# Patient Record
Sex: MALE | Race: WHITE | Employment: FULL TIME | URBAN - METROPOLITAN AREA
[De-identification: names, ages, dates, MRNs, and addresses within clinical notes are randomized per-mention and may not be internally consistent; named-entity substitution may affect disease eponyms.]

---

## 2022-11-23 ENCOUNTER — HOSPITAL ENCOUNTER (EMERGENCY)
Facility: HOSPITAL | Age: 27
Discharge: HOME | End: 2022-11-23
Admitting: PHYSICIAN ASSISTANT
Payer: COMMERCIAL

## 2022-11-23 VITALS
HEART RATE: 73 BPM | OXYGEN SATURATION: 98 % | BODY MASS INDEX: 29.62 KG/M2 | TEMPERATURE: 98.4 F | SYSTOLIC BLOOD PRESSURE: 134 MMHG | RESPIRATION RATE: 16 BRPM | WEIGHT: 200 LBS | DIASTOLIC BLOOD PRESSURE: 92 MMHG | HEIGHT: 69 IN

## 2022-11-23 DIAGNOSIS — K12.0 APHTHOUS ULCER: ICD-10-CM

## 2022-11-23 LAB
DEPRECATED S PYO AG THROAT QL EIA: NEGATIVE
FLUAV RNA SPEC QL NAA+PROBE: NEGATIVE
FLUBV RNA RESP QL NAA+PROBE: NEGATIVE
GROUP A STREP BY PCR: NOT DETECTED
RSV RNA SPEC NAA+PROBE: NEGATIVE
SARS-COV-2 RNA RESP QL NAA+PROBE: NEGATIVE

## 2022-11-23 PROCEDURE — 87651 STREP A DNA AMP PROBE: CPT | Performed by: EMERGENCY MEDICINE

## 2022-11-23 PROCEDURE — 87637 SARSCOV2&INF A&B&RSV AMP PRB: CPT | Performed by: EMERGENCY MEDICINE

## 2022-11-23 PROCEDURE — C9803 HOPD COVID-19 SPEC COLLECT: HCPCS

## 2022-11-23 PROCEDURE — 250N000009 HC RX 250: Performed by: EMERGENCY MEDICINE

## 2022-11-23 PROCEDURE — 99283 EMERGENCY DEPT VISIT LOW MDM: CPT

## 2022-11-23 PROCEDURE — 250N000013 HC RX MED GY IP 250 OP 250 PS 637: Performed by: EMERGENCY MEDICINE

## 2022-11-23 RX ADMIN — LIDOCAINE HYDROCHLORIDE 30 ML: 20 SOLUTION ORAL; TOPICAL at 15:54

## 2022-11-23 ASSESSMENT — ENCOUNTER SYMPTOMS
CHILLS: 0
FEVER: 0
COUGH: 0
RHINORRHEA: 0
DIAPHORESIS: 0
SORE THROAT: 1

## 2022-11-23 NOTE — ED PROVIDER NOTES
EMERGENCY DEPARTMENT ENCOUNTER      NAME: Ryan Martinez  AGE: 27 year old male  YOB: 1995  MRN: [id number]  EVALUATION DATE & TIME: No admission date for patient encounter.    PCP: System, Provider Not In    ED PROVIDER: Antwon Nichols PA-C      Chief Complaint   Patient presents with     Pharyngitis         FINAL IMPRESSION:  1. Aphthous ulcer          MEDICAL DECISION MAKING:    Pertinent Labs & Imaging studies reviewed. (See chart for details)  27 year old male presents to the Emergency Department for evaluation of right-sided sore throat x1 week.    After obtaining history present illness, reviewing vitals and reviewing the orders that were provided by provider in triage rapid strep test and viral swabs are negative.  Examination shows a large aphthous ulcer involving the posterior pharynx right side in front of the tonsil.  No exudate.  No additional ulcerations noted.  Remainder of exam is benign.  I discussed conservative management of aphthous ulcers as an outpatient and outpatient follow-up through primary care if there is persistent symptoms.  Overall patient agreeable to plan of care.        ED COURSE  2:12 PM Strep lab results negative  2:38 PM Flu, Covid and RSV negative.   3:35 PM I checked and updated the patient. We discussed the plan for discharge and the patient is agreeable. Reviewed supportive cares, symptomatic treatment, outpatient follow up, and reasons to return to the Emergency Department. Patient to be discharged by ED RN.       At the conclusion of the encounter I discussed the results of all of the tests and the disposition. The questions were answered. The patient or family acknowledged understanding and was agreeable with the care plan.     MEDICATIONS GIVEN IN THE EMERGENCY:  Medications   lidocaine (viscous) (XYLOCAINE) 2 % 15 mL, alum & mag hydroxide-simethicone (MAALOX) 15 mL GI Cocktail (30 mLs Oral Given 11/23/22 6158)       NEW PRESCRIPTIONS STARTED AT TODAY'S ER  "VISIT  New Prescriptions    No medications on file            =================================================================    HPI    Patient information was obtained from: Patient     Use of Interpretor: N/A        Ryan Martinez is a 27 year old male with no contributory medical history who presents to this ED via walk in for evaluation of pharyngitis.    The patient started having a sore throat that began last Thursday (6 days ago). He also endorses feeling tired, but denies any fever, chills, cough, runny nose or diaphoresis. Patient is unsure if he's had any sick contacts recently. Patient took dayquil for his pain.       REVIEW OF SYSTEMS   Review of Systems   Constitutional: Negative for chills, diaphoresis and fever.        Positive for feeling tired     HENT: Positive for sore throat. Negative for rhinorrhea.    Respiratory: Negative for cough.    All other systems reviewed and are negative.         PAST MEDICAL HISTORY:  History reviewed. No pertinent past medical history.    PAST SURGICAL HISTORY:  No past surgical history on file.      CURRENT MEDICATIONS:    No current facility-administered medications for this encounter.  No current outpatient medications on file.      ALLERGIES:  No Known Allergies    FAMILY HISTORY:  No family history on file.    SOCIAL HISTORY:   Social History     Socioeconomic History     Marital status:        VITALS:  Patient Vitals for the past 24 hrs:   BP Temp Pulse Resp SpO2 Height Weight   11/23/22 1433 (!) 134/92 -- 73 16 98 % -- --   11/23/22 1322 (!) 174/90 98.4  F (36.9  C) 78 20 97 % 1.753 m (5' 9\") 90.7 kg (200 lb)       PHYSICAL EXAM    Physical Exam  Vitals and nursing note reviewed.   Constitutional:       General: He is not in acute distress.     Appearance: He is normal weight. He is not toxic-appearing or diaphoretic.   HENT:      Head: Normocephalic.      Right Ear: External ear normal.      Left Ear: External ear normal.      Nose: Nose normal. No " congestion or rhinorrhea.      Mouth/Throat:      Mouth: Mucous membranes are moist.      Pharynx: No oropharyngeal exudate or posterior oropharyngeal erythema.      Comments: Midline uvula, no tonsillar enlargement.  No exudate.  Examination of the posterior pharynx soft tissue in front of the right tonsil does show a large aphthous ulcer approximately 1.5 cm in diameter.  No bleeding.  No additional ulcerations noted.  Tongue is normal.  Eyes:      Conjunctiva/sclera: Conjunctivae normal.   Cardiovascular:      Rate and Rhythm: Normal rate.      Pulses: Normal pulses.   Pulmonary:      Effort: Pulmonary effort is normal. No respiratory distress.   Musculoskeletal:         General: No tenderness or deformity. Normal range of motion.      Cervical back: Normal range of motion.   Lymphadenopathy:      Cervical: No cervical adenopathy.   Skin:     General: Skin is warm and dry.      Findings: No rash.   Neurological:      General: No focal deficit present.      Mental Status: He is alert. Mental status is at baseline.      Sensory: No sensory deficit.      Motor: No weakness.          LAB:  All pertinent labs reviewed and interpreted.  Results for orders placed or performed during the hospital encounter of 11/23/22   Symptomatic; Unknown Influenza A/B & SARS-CoV2 (COVID-19) Virus PCR Multiplex Nasopharyngeal    Specimen: Nasopharyngeal; Swab   Result Value Ref Range    Influenza A PCR Negative Negative    Influenza B PCR Negative Negative    RSV PCR Negative Negative    SARS CoV2 PCR Negative Negative   Streptococcus A Rapid Screen w/Reflex to PCR    Specimen: Throat; Swab   Result Value Ref Range    Group A Strep antigen Negative Negative       RADIOLOGY:  Reviewed all pertinent imaging. Please see official radiology report.  No orders to display           Kwasi FITCH am serving as a scribe to document services personally performed by Antwon Nichols PA-C based on my observation and the provider's  statements to me. I, Antwon Nichols PA-C attest that Kwasi Waddell is acting in a scribe capacity, has observed my performance of the services and has documented them in accordance with my direction.    Antwon Nichols PA-C  Emergency Medicine  [facility]     Antwon Nichols PA-C  11/23/22 1553

## 2022-11-23 NOTE — ED NOTES
"ED Triage Provider Note  [facility] EMERGENCY DEPARTMENT  Encounter Date: Nov 23, 2022    History:  Chief Complaint   Patient presents with     Pharyngitis     Ryan Martinez is a 27 year old male here for sore throat.     Review of Systems:  No other upper respiratory symptoms    Exam:  BP (!) 174/90   Pulse 78   Temp 98.4  F (36.9  C)   Resp 20   Ht 1.753 m (5' 9\")   Wt 90.7 kg (200 lb)   SpO2 97%   BMI 29.53 kg/m    General: No acute distress. Appears stated age.   Cardio: normal rate, extremities well perfused  Resp: Normal work of breathing, grossly normal respiratory rate  Neuro: Alert. Facial movement grossly symmetric. Grossly intact strength.   Shallow ulcer right tonsil    Medical Decision Making:  Patient arriving to the ED with problem as above.     orders initiated from Triage. The patient is most appropriate to return to the waiting room.       Charles Braga MD  11/23/2022 at 1:28 PM     Charles Braga MD  11/23/22 1330    "

## 2022-11-23 NOTE — DISCHARGE INSTRUCTIONS
I recommend salt water gargles 3-4 times a day.  Tylenol ibuprofen as needed for pain.  If there is persistent symptoms greater than another week please consider outpatient follow-up with your primary care.    Your strep culture will be followed and if this returns positive we will call you with those results.